# Patient Record
Sex: MALE | Race: OTHER | HISPANIC OR LATINO | URBAN - METROPOLITAN AREA
[De-identification: names, ages, dates, MRNs, and addresses within clinical notes are randomized per-mention and may not be internally consistent; named-entity substitution may affect disease eponyms.]

---

## 2024-06-21 ENCOUNTER — EMERGENCY (EMERGENCY)
Facility: HOSPITAL | Age: 24
LOS: 1 days | Discharge: HOME | End: 2024-06-21
Attending: EMERGENCY MEDICINE | Admitting: EMERGENCY MEDICINE
Payer: OTHER MISCELLANEOUS

## 2024-06-21 VITALS
TEMPERATURE: 98 F | DIASTOLIC BLOOD PRESSURE: 90 MMHG | RESPIRATION RATE: 18 BRPM | SYSTOLIC BLOOD PRESSURE: 128 MMHG | OXYGEN SATURATION: 96 % | HEART RATE: 88 BPM
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
## 2024-06-21 NOTE — ED PROVIDER NOTE - NSFOLLOWUPINSTRUCTIONS_ED_ALL_ED_FT
Human Bite    WHAT YOU NEED TO KNOW:    What do I need to know about a human bite? Human bites are often more serious than animal bites. The wound may be deep and cause injury to bones, muscles, and other body parts. Wounds are more likely to become infected because of the germs in a person's mouth.    What are the signs and symptoms of a human bite?    Cuts, bruises, or swelling    Bleeding or pus    Redness, tenderness, and warmth around the wound    Difficulty moving the wounded area or deformed skin    Fever  How is a human bite diagnosed? Your healthcare provider will look closely at the injury, including the area around it. Your provider will check to see if the skin is broken and how deep the wound is. Your provider will also look for other problems or signs of infection. Your provider may check your health history, including other illnesses, medicines you take, and past surgeries. Tell your provider which vaccinations you have received, such as tetanus and hepatitis B. Tell your provider when and how you were bitten. You may need any of the following:    A wound culture is a test to grow and identify any germs in your wound. This helps healthcare providers find out if you have an infection and what medicine is best to treat it.    Blood tests are used to check for an infection.    X-ray pictures may show broken bones or other injuries.  How is a human bite treated? Treatment will depend on how severe the wound is, its location, and whether other areas are affected. It may also depend on the length of time you have had the injury. You may need any of the following:    The wound will be cleaned with soap and water or antibacterial solution. This helps wash away germs and decreases the risk for infection. Objects, dirt, or dead tissues will be removed from the open wound.    Medicines may be given to prevent or treat a bacterial infection, pain, swelling, or fever. Tetanus shots, antivirals, and immune globulins may be also be given.    Stitches may be used to close the wound.    Surgery may be needed to repair a broken bone or damaged joint, tendon, or nerve. Rarely, you may need surgery to rebuild the body part with the bite wound.  How should I care for my wound?    Wash your hands. Wash before and after you care for your wound to prevent infection.  Handwashing      Clean the wound with mild soap and water. Pat the area dry. Check the wound and the area around it. Look for any swelling, redness, or fluid oozing out of it. Do this as often as ordered by your healthcare provider. Keep the area clean to prevent infection.    Cover the wound with a layer of clean gauze bandage. The bandage should be wrapped snugly, but do not wrap it tightly. You should be able to put 2 fingers under the bandage. It is too tight if you feel tingling or lose feeling in that area.    Apply pressure to stop any bleeding. Use a clean cloth to apply direct pressure to the wound.    Sit or lie so the bite area is raised above your heart, if possible. This will decrease swelling. Put pillows under an injured leg when lying in bed. A sling may be used if your arm or hand is injured.  When should I seek immediate care?    You have trouble swallowing, and your jaw and neck are stiff.    You have trouble talking, walking, or breathing.    You have increased redness, numbness, or swelling in the bitten area.    Your wound does not stop bleeding even after you apply pressure.    Your pain is the same or worse even after you take pain medicine.    Your wound or bandage has pus or a bad smell, even if you clean it every day.  When should I call my doctor?    You have a fever.    You have numbness or tingling in the area of the bite.    You have pain or problems moving the injured area or get tender lumps in the groin or armpits.    You have questions or concerns about your condition or care.  CARE AGREEMENT:    You have the right to help plan your care. Learn about your health condition and how it may be treated. Discuss treatment options with your healthcare providers to decide what care you want to receive. You always have the right to refuse treatment.    © [employer] 1973, 2024
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
## 2024-06-21 NOTE — ED PROVIDER NOTE - PATIENT PORTAL LINK FT
You can access the FollowMyHealth Patient Portal offered by [facility] by registering at the following website: [email or web address]. By joining [employer]’s FollowMyHealth portal, you will also be able to view your health information using other applications (apps) compatible with our system.

## 2024-06-21 NOTE — ED PROVIDER NOTE - PHYSICAL EXAMINATION
Const: No apparent distress  Eyes: PERRL, no conjunctival injection  HENT:  Neck supple without meningismus   MSK: No gross deformities appreciated  Skin: Warm, dry. No rashes, abrasion to R forearm   Neuro: Alert, CNs II-XII grossly intact. Sensation and motor function of extremities grossly intact.  Psych: Appropriate mood and affect.
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
## 2024-06-24 DIAGNOSIS — S51.851A OPEN BITE OF RIGHT FOREARM, INITIAL ENCOUNTER: ICD-10-CM
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
